# Patient Record
Sex: FEMALE
[De-identification: names, ages, dates, MRNs, and addresses within clinical notes are randomized per-mention and may not be internally consistent; named-entity substitution may affect disease eponyms.]

---

## 2022-04-19 ENCOUNTER — APPOINTMENT (OUTPATIENT)
Dept: OTOLARYNGOLOGY | Facility: CLINIC | Age: 27
End: 2022-04-19
Payer: COMMERCIAL

## 2022-04-19 ENCOUNTER — TRANSCRIPTION ENCOUNTER (OUTPATIENT)
Age: 27
End: 2022-04-19

## 2022-04-19 VITALS — TEMPERATURE: 97.6 F | HEIGHT: 64 IN | BODY MASS INDEX: 22.2 KG/M2 | WEIGHT: 130 LBS

## 2022-04-19 DIAGNOSIS — Z72.89 OTHER PROBLEMS RELATED TO LIFESTYLE: ICD-10-CM

## 2022-04-19 DIAGNOSIS — Z80.3 FAMILY HISTORY OF MALIGNANT NEOPLASM OF BREAST: ICD-10-CM

## 2022-04-19 DIAGNOSIS — Z78.9 OTHER SPECIFIED HEALTH STATUS: ICD-10-CM

## 2022-04-19 DIAGNOSIS — Z86.79 PERSONAL HISTORY OF OTHER DISEASES OF THE CIRCULATORY SYSTEM: ICD-10-CM

## 2022-04-19 DIAGNOSIS — J31.0 CHRONIC RHINITIS: ICD-10-CM

## 2022-04-19 DIAGNOSIS — R04.0 EPISTAXIS: ICD-10-CM

## 2022-04-19 DIAGNOSIS — R09.82 POSTNASAL DRIP: ICD-10-CM

## 2022-04-19 DIAGNOSIS — J34.2 DEVIATED NASAL SEPTUM: ICD-10-CM

## 2022-04-19 PROBLEM — Z00.00 ENCOUNTER FOR PREVENTIVE HEALTH EXAMINATION: Status: ACTIVE | Noted: 2022-04-19

## 2022-04-19 PROCEDURE — 99203 OFFICE O/P NEW LOW 30 MIN: CPT

## 2022-04-19 RX ORDER — CETIRIZINE HCL 10 MG
TABLET ORAL
Refills: 0 | Status: ACTIVE | COMMUNITY

## 2022-04-19 RX ORDER — TIOTROPIUM BROMIDE 18 UG/1
CAPSULE ORAL; RESPIRATORY (INHALATION)
Refills: 0 | Status: ACTIVE | COMMUNITY

## 2022-04-19 NOTE — ASSESSMENT
[FreeTextEntry1] : She has a history of recurrent left anterior epistaxis.  The bleeding source appears to be the anterior left nasal septum.  Nasal endoscopy showed a deviated septum with inferior turbinate hypertrophy and mild nasal mucosal edema.  There were no suspicious masses or lesions seen.  She also has a history of chronic rhinitis, allergies, and postnasal drip.\par \par Plan\par -Findings and management options were discussed with the patient.\par - Literature for epistaxis management given\par - Patient told to avoid heavy lifting, bending, straining, nose blowing and nasal manipulation\par - Avoid aspirin, NSAIDS if able to\par - Humidifier recommended\par - Nasal saline spray PRN \par - Moisturizing gel for one week and then a moisturizing nasal gel as needed\par -We discussed cauterization.  She would like to try conservative management first\par - She was given literature regarding postnasal drip.\par -Allergy evaluation\par - follow up after the allergy evaluation or if she has recurrent bleeding.\par - call and return earlier if any concerns

## 2022-04-19 NOTE — HISTORY OF PRESENT ILLNESS
[de-identified] : HERMILO ROMERO is a 26 year old patient here for a second opinion regarding epistaxis.  She said that she has a 2-year history of left sided nosebleeds.  They occur every couple of months.  The last one was yesterday.  They typically last about 5 minutes.  It stopped although she did not hold pressure.  She had been blowing her nose.  She suffers from allergies.  She also has a history of chronic postnasal drip and nasal drainage.  She saw an ENT yesterday who recommended Vaseline.  She has no history of nasal trauma or nasal/sinus surgery.  She denies a personal or family history of coagulopathy.  She does not take anticoagulants.